# Patient Record
(demographics unavailable — no encounter records)

---

## 2024-12-20 NOTE — HISTORY OF PRESENT ILLNESS
[Mother] : mother [Grade ___] : Grade [unfilled] [Normal] : Normal [No] : No cigarette smoke exposure [Water heater temperature set at <120 degrees F] : Water heater temperature set at <120 degrees F [Car seat in back seat] : Car seat in back seat [Carbon Monoxide Detectors] : Carbon monoxide detectors [Smoke Detectors] : Smoke detectors [Supervised outdoor play] : Supervised outdoor play [FreeTextEntry3] : tends to sleep late [de-identified] : ; occupation: "" [NO] : No

## 2024-12-20 NOTE — DISCUSSION/SUMMARY
[Normal Growth] : growth [Normal Development] : development [None] : No known medical problems [No Elimination Concerns] : elimination [No Feeding Concerns] : feeding [No Skin Concerns] : skin [School Readiness] : school readiness [Mental Health] : mental health [Nutrition and Physical Activity] : nutrition and physical activity [Oral Health] : oral health [Safety] : safety [No Medications] : ~He/She~ is not on any medications [Patient] : patient [de-identified] : sleep earlier! [] : The components of the vaccine(s) to be administered today are listed in the plan of care. The disease(s) for which the vaccine(s) are intended to prevent and the risks have been discussed with the caretaker.  The risks are also included in the appropriate vaccination information statements which have been provided to the patient's caregiver.  The caregiver has given consent to vaccinate.

## 2025-05-12 NOTE — PHYSICAL EXAM
[NL] : warm, clear [Subcostal Retractions] : no subcostal retractions [Suprasternal Retractions] : no suprasternal retractions [FreeTextEntry7] : diffuse coarse breath sounds

## 2025-05-12 NOTE — HISTORY OF PRESENT ILLNESS
[de-identified] : COUGH, ITCHY EYES, WHEEZING, FEVER [FreeTextEntry6] : 8 y/o M complaining of fever at night x2 days. Tmax of ~101 F. Endorses cough and congestion x3 days. Parent notes that child was wheezing last night, improved with Albuteol. Notes eye discomfort, yellow eye discharge and injected conjunctiva. Administering Zyrtec and Pataday without relief.

## 2025-05-12 NOTE — DISCUSSION/SUMMARY
[FreeTextEntry1] : 6 y/o M present with fever x2 days and cough/congestion x3 days. Exam notable for diffuse coarse breath sounds. Presentation concerning for atypical pneumonia.  Plan: 1. Respiratory pathogen panel. Discussed option for beginning Azithromycin considering clinical suspicion for atypical pneumonia vs sending RPP and mother prefers to send panel and defer abx until results return. 2. Supportive care with Tylenol/Motrin PRN, increased fluids, keeping head elevated and rest. Albuterol q4hrs PRN for any wheezing. 3. Ofloxacin sent to pharmacy considering hx concerning for bacterial conjunctivitis. However, exam today with clear eyes. Instructed to only begin abx eyedrops if symptoms return. 4. Monitor and return with any new or worsening symptoms.